# Patient Record
Sex: MALE | Race: WHITE | NOT HISPANIC OR LATINO | URBAN - METROPOLITAN AREA
[De-identification: names, ages, dates, MRNs, and addresses within clinical notes are randomized per-mention and may not be internally consistent; named-entity substitution may affect disease eponyms.]

---

## 2023-05-10 ENCOUNTER — TELEPHONE (OUTPATIENT)
Dept: OTHER | Facility: OTHER | Age: 63
End: 2023-05-10

## 2023-05-10 NOTE — TELEPHONE ENCOUNTER
Spoke with Dr Nuvia Perez he will have to see patient in office to exam patient and do an urinalysis on patient    Called and spoke with patient and inform patient of Dr Sherie Sy recommendations  Patient did not want to drive and hour just for an UTI  Informed patient to follow up with his PCP      Patient verbally understood

## 2023-05-10 NOTE — TELEPHONE ENCOUNTER
Patient stated he seen Dr Roxann Ayala, though a different network  Patient is requesting a call back to establish care with provider again  Patient lives over an hour away and is inquiring to make a virtual appointment  Patient confirmed that he feels that he is getting a UTI  Patient stated that he uses catheters and needs an order for supplies